# Patient Record
Sex: FEMALE | Race: WHITE | Employment: OTHER | URBAN - METROPOLITAN AREA
[De-identification: names, ages, dates, MRNs, and addresses within clinical notes are randomized per-mention and may not be internally consistent; named-entity substitution may affect disease eponyms.]

---

## 2019-11-26 ENCOUNTER — OFFICE VISIT (OUTPATIENT)
Dept: OBGYN CLINIC | Facility: CLINIC | Age: 44
End: 2019-11-26
Payer: COMMERCIAL

## 2019-11-26 VITALS
BODY MASS INDEX: 27.89 KG/M2 | WEIGHT: 184 LBS | SYSTOLIC BLOOD PRESSURE: 116 MMHG | HEIGHT: 68 IN | DIASTOLIC BLOOD PRESSURE: 82 MMHG

## 2019-11-26 DIAGNOSIS — R10.2 PELVIC PAIN IN FEMALE: Primary | ICD-10-CM

## 2019-11-26 PROBLEM — Z98.51 H/O TUBAL LIGATION: Status: ACTIVE | Noted: 2019-11-26

## 2019-11-26 PROBLEM — Z98.890 HISTORY OF PNEUMONECTOMY: Status: ACTIVE | Noted: 2019-11-26

## 2019-11-26 PROBLEM — Z87.59 HX OF ECTOPIC PREGNANCY: Status: ACTIVE | Noted: 2019-11-26

## 2019-11-26 PROBLEM — Z90.2 HISTORY OF PNEUMONECTOMY: Status: ACTIVE | Noted: 2019-11-26

## 2019-11-26 PROBLEM — Z79.899 NEED FOR PROPHYLACTIC CHEMOTHERAPY: Status: ACTIVE | Noted: 2019-02-22

## 2019-11-26 PROBLEM — Z92.3 HX OF THERAPEUTIC RADIATION: Status: ACTIVE | Noted: 2019-11-26

## 2019-11-26 PROBLEM — C53.8 MALIGNANT NEOPLASM OF OVERLAPPING SITES OF CERVIX (HCC): Status: ACTIVE | Noted: 2019-01-25

## 2019-11-26 PROCEDURE — 99203 OFFICE O/P NEW LOW 30 MIN: CPT | Performed by: OBSTETRICS & GYNECOLOGY

## 2019-11-26 NOTE — PROGRESS NOTES
Louise Finn is here for a checkup. Patient is visiting [facility] from [place]. She will be leaving [facility] tomorrow morning. Patient is a 42-year-old  female  3 para 2-0-1-2.   Patient has had 2 normal spontaneous vaginal delivery and 1 abdominal exam shows abdomen to be soft, nondistended, nontender with no palpable abdominal masses. Inguinal areas bilaterally were normal without any obvious evidence of enlarged lymph nodes or lymphadenopathy.   X    [address]